# Patient Record
Sex: FEMALE | Race: WHITE | Employment: STUDENT | URBAN - NONMETROPOLITAN AREA
[De-identification: names, ages, dates, MRNs, and addresses within clinical notes are randomized per-mention and may not be internally consistent; named-entity substitution may affect disease eponyms.]

---
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
## 2023-06-15 ENCOUNTER — TELEPHONE (OUTPATIENT)
Dept: UROLOGY | Age: 18
End: 2023-06-15

## 2023-12-04 SDOH — HEALTH STABILITY: PHYSICAL HEALTH: ON AVERAGE, HOW MANY DAYS PER WEEK DO YOU ENGAGE IN MODERATE TO STRENUOUS EXERCISE (LIKE A BRISK WALK)?: 3 DAYS

## 2023-12-04 SDOH — HEALTH STABILITY: PHYSICAL HEALTH: ON AVERAGE, HOW MANY MINUTES DO YOU ENGAGE IN EXERCISE AT THIS LEVEL?: 60 MIN

## 2023-12-05 ENCOUNTER — OFFICE VISIT (OUTPATIENT)
Dept: FAMILY MEDICINE CLINIC | Age: 18
End: 2023-12-05

## 2023-12-05 VITALS
WEIGHT: 150 LBS | OXYGEN SATURATION: 98 % | DIASTOLIC BLOOD PRESSURE: 88 MMHG | SYSTOLIC BLOOD PRESSURE: 124 MMHG | HEART RATE: 82 BPM | RESPIRATION RATE: 16 BRPM | BODY MASS INDEX: 29.45 KG/M2 | HEIGHT: 60 IN | TEMPERATURE: 97.6 F

## 2023-12-05 DIAGNOSIS — Z30.42 ENCOUNTER FOR DEPO-PROVERA CONTRACEPTION: ICD-10-CM

## 2023-12-05 DIAGNOSIS — Z91.038 ALLERGY TO INSECT BITES AND STINGS: ICD-10-CM

## 2023-12-05 DIAGNOSIS — G47.00 INSOMNIA, UNSPECIFIED TYPE: Primary | ICD-10-CM

## 2023-12-05 LAB
CONTROL: POSITIVE
PREGNANCY TEST URINE, POC: NEGATIVE

## 2023-12-05 PROCEDURE — 81025 URINE PREGNANCY TEST: CPT | Performed by: NURSE PRACTITIONER

## 2023-12-05 PROCEDURE — 99204 OFFICE O/P NEW MOD 45 MIN: CPT | Performed by: NURSE PRACTITIONER

## 2023-12-05 RX ORDER — MEDROXYPROGESTERONE ACETATE 150 MG/ML
INJECTION, SUSPENSION INTRAMUSCULAR
COMMUNITY
Start: 2023-09-16 | End: 2023-12-05 | Stop reason: SDUPTHER

## 2023-12-05 RX ORDER — MEDROXYPROGESTERONE ACETATE 150 MG/ML
150 INJECTION, SUSPENSION INTRAMUSCULAR
Qty: 1 ML | Refills: 1 | Status: SHIPPED | OUTPATIENT
Start: 2023-12-05

## 2023-12-05 RX ORDER — HYDROXYZINE HYDROCHLORIDE 25 MG/1
25 TABLET, FILM COATED ORAL NIGHTLY PRN
Qty: 30 TABLET | Refills: 0 | Status: SHIPPED | OUTPATIENT
Start: 2023-12-05 | End: 2024-01-04

## 2023-12-05 SDOH — ECONOMIC STABILITY: FOOD INSECURITY: WITHIN THE PAST 12 MONTHS, YOU WORRIED THAT YOUR FOOD WOULD RUN OUT BEFORE YOU GOT MONEY TO BUY MORE.: NEVER TRUE

## 2023-12-05 SDOH — ECONOMIC STABILITY: INCOME INSECURITY: HOW HARD IS IT FOR YOU TO PAY FOR THE VERY BASICS LIKE FOOD, HOUSING, MEDICAL CARE, AND HEATING?: NOT HARD AT ALL

## 2023-12-05 SDOH — ECONOMIC STABILITY: HOUSING INSECURITY
IN THE LAST 12 MONTHS, WAS THERE A TIME WHEN YOU DID NOT HAVE A STEADY PLACE TO SLEEP OR SLEPT IN A SHELTER (INCLUDING NOW)?: NO

## 2023-12-05 SDOH — ECONOMIC STABILITY: FOOD INSECURITY: WITHIN THE PAST 12 MONTHS, THE FOOD YOU BOUGHT JUST DIDN'T LAST AND YOU DIDN'T HAVE MONEY TO GET MORE.: NEVER TRUE

## 2023-12-05 ASSESSMENT — ENCOUNTER SYMPTOMS
COUGH: 0
BACK PAIN: 0
NAUSEA: 0
RHINORRHEA: 0
CONSTIPATION: 0
ABDOMINAL DISTENTION: 0
COLOR CHANGE: 0
ABDOMINAL PAIN: 0
SHORTNESS OF BREATH: 0
DIARRHEA: 0
CHEST TIGHTNESS: 0
SORE THROAT: 0

## 2023-12-05 ASSESSMENT — PATIENT HEALTH QUESTIONNAIRE - PHQ9
1. LITTLE INTEREST OR PLEASURE IN DOING THINGS: 0
SUM OF ALL RESPONSES TO PHQ QUESTIONS 1-9: 0
SUM OF ALL RESPONSES TO PHQ QUESTIONS 1-9: 0
SUM OF ALL RESPONSES TO PHQ9 QUESTIONS 1 & 2: 0
2. FEELING DOWN, DEPRESSED OR HOPELESS: 0
SUM OF ALL RESPONSES TO PHQ QUESTIONS 1-9: 0
SUM OF ALL RESPONSES TO PHQ QUESTIONS 1-9: 0

## 2023-12-05 NOTE — PROGRESS NOTES
Marilia Jane, APRN-CNP  [address] [facility]  [address] [address] [facility], [address] [address]  Dept: [phone]  Dept Fax: [phone]     Patient ID: Hawa Castro is a 21 y.o. female. HPI    Hawa Castro is a 21 y.o. female New patient who presents to the office today for a first visit and to establish a relationship with a new primary care provider. Previous PCP: Dr. Luis Schaffer last seen: SAHIL,[id number] from [facility]    Today, the patient complains of needing depo shot.   - depo for about 4 years now. She understands the risk of being on it  -Trazadone was giving her bad migraines, she was taking it for insomnia. Trouble staying asleep she will wake up at least 3-5 times a night. Preventative care, female:  Nicotine use: never  Alcohol use: never  Drug use: never  Dental exam: a year ago, missed cleaning due to moving   Eye exam: a couple years ago     Specialists:  None    Previous office notes, labs, imaging and hospital records were reviewed prior to and during encounter. The patient's past medical, surgical, social, and family history as well as her current medications and allergies were reviewed as documented in today's encounter by RORY Major. Current Outpatient Medications on File Prior to Visit   Medication Sig Dispense Refill    traZODone (DESYREL) 50 MG tablet Take 1 tablet by mouth nightly as needed for Sleep 30 tablet 5    EPINEPHrine (EPIPEN 2-MALKA) 0.3 MG/0.3ML SOAJ injection Use as directed for allergic reaction  Ok to dispense 2 if this is a single pack (Patient not taking: Reported on 10/26/2022) 1 each 0     No current facility-administered medications on file prior to visit. Subjective:     Review of Systems   Constitutional:  Negative for activity change, fatigue and fever. HENT:  Negative for congestion, ear pain, rhinorrhea and sore throat. Respiratory:  Negative for cough, chest tightness and shortness of breath.

## 2024-01-02 ENCOUNTER — NURSE ONLY (OUTPATIENT)
Dept: FAMILY MEDICINE CLINIC | Age: 19
End: 2024-01-02

## 2024-01-02 DIAGNOSIS — Z30.42 ENCOUNTER FOR SURVEILLANCE OF INJECTABLE CONTRACEPTIVE: Primary | ICD-10-CM

## 2024-01-02 PROCEDURE — 96372 THER/PROPH/DIAG INJ SC/IM: CPT | Performed by: NURSE PRACTITIONER

## 2024-01-02 RX ORDER — MEDROXYPROGESTERONE ACETATE 150 MG/ML
150 INJECTION, SUSPENSION INTRAMUSCULAR ONCE
Status: COMPLETED | OUTPATIENT
Start: 2024-01-02 | End: 2024-01-02

## 2024-01-02 RX ADMIN — MEDROXYPROGESTERONE ACETATE 150 MG: 150 INJECTION, SUSPENSION INTRAMUSCULAR at 14:58

## 2024-01-02 ASSESSMENT — PATIENT HEALTH QUESTIONNAIRE - PHQ9
SUM OF ALL RESPONSES TO PHQ9 QUESTIONS 1 & 2: 0
SUM OF ALL RESPONSES TO PHQ QUESTIONS 1-9: 0
1. LITTLE INTEREST OR PLEASURE IN DOING THINGS: 0
SUM OF ALL RESPONSES TO PHQ QUESTIONS 1-9: 0
SUM OF ALL RESPONSES TO PHQ QUESTIONS 1-9: 0
2. FEELING DOWN, DEPRESSED OR HOPELESS: 0
SUM OF ALL RESPONSES TO PHQ QUESTIONS 1-9: 0

## 2024-01-04 ENCOUNTER — OFFICE VISIT (OUTPATIENT)
Dept: FAMILY MEDICINE CLINIC | Age: 19
End: 2024-01-04

## 2024-01-04 VITALS
DIASTOLIC BLOOD PRESSURE: 84 MMHG | BODY MASS INDEX: 29.88 KG/M2 | RESPIRATION RATE: 16 BRPM | HEART RATE: 88 BPM | WEIGHT: 153 LBS | OXYGEN SATURATION: 98 % | TEMPERATURE: 98.2 F | SYSTOLIC BLOOD PRESSURE: 130 MMHG

## 2024-01-04 DIAGNOSIS — B96.89 ACUTE BACTERIAL SINUSITIS: Primary | ICD-10-CM

## 2024-01-04 DIAGNOSIS — U07.1 COVID-19: ICD-10-CM

## 2024-01-04 DIAGNOSIS — J01.90 ACUTE BACTERIAL SINUSITIS: Primary | ICD-10-CM

## 2024-01-04 DIAGNOSIS — Z30.014 ENCOUNTER FOR INITIAL PRESCRIPTION OF INTRAUTERINE CONTRACEPTIVE DEVICE (IUD): ICD-10-CM

## 2024-01-04 PROCEDURE — 99213 OFFICE O/P EST LOW 20 MIN: CPT | Performed by: NURSE PRACTITIONER

## 2024-01-04 RX ORDER — PREDNISONE 20 MG/1
20 TABLET ORAL DAILY
Qty: 5 TABLET | Refills: 0 | Status: SHIPPED | OUTPATIENT
Start: 2024-01-04 | End: 2024-01-09

## 2024-01-04 RX ORDER — AZITHROMYCIN 250 MG/1
250 TABLET, FILM COATED ORAL SEE ADMIN INSTRUCTIONS
Qty: 6 TABLET | Refills: 0 | Status: SHIPPED | OUTPATIENT
Start: 2024-01-04 | End: 2024-01-09

## 2024-01-04 ASSESSMENT — ENCOUNTER SYMPTOMS
SHORTNESS OF BREATH: 1
COLOR CHANGE: 0
DIARRHEA: 0
NAUSEA: 0
BACK PAIN: 0
ABDOMINAL DISTENTION: 0
ABDOMINAL PAIN: 0
COUGH: 0
CONSTIPATION: 0
RHINORRHEA: 0
SORE THROAT: 1
CHEST TIGHTNESS: 1

## 2024-01-04 NOTE — PROGRESS NOTES
Oanh Jaime, APRN-CNP  [facility]  [facility] MEDICINE  [address], [address]  [facility] [address]  Dept: [phone]  Dept Fax: [phone]     Patient ID: Essence Ornelas is a 18 y.o. female Established patient    HPI    Pt here today for an acute visit secondary to recently had covid but has tested negative as of Wednesday last week. Since having covid she is having a heaviness to her chest and feels like it is hard to breath at times. Feels like she is having shortness of breath with doing activities or even talking. She has also developed a rash to her hands and bilateral knees and feels itchy.     Pt denies any fever or chills.  Pt today denies any HA, chest pain, or SOB.  Pt denies any N/V/D/C or abdominal pain.    Otherwise pt doing well on current tx and no other concerns today.     The patient's past medical, surgical, social, and family history as well as his current medications and allergies were reviewed as documented in today's encounter by RORY Sapp.      Previous office notes, labs, imaging and hospital records were reviewed prior to and during encounter.    Current Outpatient Medications on File Prior to Visit   Medication Sig Dispense Refill    hydrOXYzine HCl (ATARAX) 25 MG tablet Take 1 tablet by mouth nightly as needed (insomnia) 30 tablet 0    medroxyPROGESTERone (DEPO-PROVERA) 150 MG/ML injection Inject 150 mg into the muscle every 3 months 1 mL 1    fluticasone (FLONASE) 50 MCG/ACT nasal spray 1 spray by Each Nostril route daily (Patient not taking: Reported on 1/4/2024) 32 g 0     No current facility-administered medications on file prior to visit.        Subjective:     Review of Systems   Constitutional:  Negative for activity change, fatigue and fever.   HENT:  Positive for sore throat. Negative for congestion, ear pain and rhinorrhea.    Respiratory:  Positive for chest tightness and shortness of breath. Negative for cough.

## 2024-01-11 ENCOUNTER — TELEPHONE (OUTPATIENT)
Dept: OBGYN CLINIC | Age: 19
End: 2024-01-11

## 2024-01-23 DIAGNOSIS — J01.90 ACUTE BACTERIAL SINUSITIS: ICD-10-CM

## 2024-01-23 DIAGNOSIS — B96.89 ACUTE BACTERIAL SINUSITIS: ICD-10-CM

## 2024-01-23 DIAGNOSIS — Z30.42 ENCOUNTER FOR DEPO-PROVERA CONTRACEPTION: ICD-10-CM

## 2024-01-23 DIAGNOSIS — G47.00 INSOMNIA, UNSPECIFIED TYPE: ICD-10-CM

## 2024-01-23 RX ORDER — FLUTICASONE PROPIONATE 50 MCG
SPRAY, SUSPENSION (ML) NASAL
Qty: 1 EACH | Refills: 3 | Status: SHIPPED | OUTPATIENT
Start: 2024-01-23

## 2024-01-23 RX ORDER — HYDROXYZINE HYDROCHLORIDE 25 MG/1
25 TABLET, FILM COATED ORAL NIGHTLY PRN
Qty: 30 TABLET | Refills: 1 | Status: SHIPPED | OUTPATIENT
Start: 2024-01-23 | End: 2024-03-23

## 2024-01-24 RX ORDER — MEDROXYPROGESTERONE ACETATE 150 MG/ML
150 INJECTION, SUSPENSION INTRAMUSCULAR
Qty: 1 ML | Refills: 1 | OUTPATIENT
Start: 2024-01-24

## 2024-01-28 DIAGNOSIS — Z30.42 ENCOUNTER FOR DEPO-PROVERA CONTRACEPTION: ICD-10-CM

## 2024-01-29 RX ORDER — MEDROXYPROGESTERONE ACETATE 150 MG/ML
150 INJECTION, SUSPENSION INTRAMUSCULAR
Qty: 1 ML | Refills: 1 | OUTPATIENT
Start: 2024-01-29

## 2024-01-29 NOTE — TELEPHONE ENCOUNTER
Last Visit Date: 1/4/2024   Next Visit Date: 6/5/2024     Patient Comment: Decided against IUD, would like to continue deop for now

## 2024-02-28 ENCOUNTER — TELEMEDICINE (OUTPATIENT)
Dept: FAMILY MEDICINE CLINIC | Age: 19
End: 2024-02-28

## 2024-02-28 DIAGNOSIS — J01.90 ACUTE BACTERIAL SINUSITIS: Primary | ICD-10-CM

## 2024-02-28 DIAGNOSIS — B96.89 ACUTE BACTERIAL SINUSITIS: Primary | ICD-10-CM

## 2024-02-28 PROCEDURE — 99213 OFFICE O/P EST LOW 20 MIN: CPT | Performed by: NURSE PRACTITIONER

## 2024-02-28 RX ORDER — AZITHROMYCIN 250 MG/1
TABLET, FILM COATED ORAL
Qty: 6 TABLET | Refills: 0 | Status: SHIPPED | OUTPATIENT
Start: 2024-02-28 | End: 2024-03-09

## 2024-02-28 ASSESSMENT — ENCOUNTER SYMPTOMS
NAUSEA: 0
ABDOMINAL DISTENTION: 0
BACK PAIN: 0
DIARRHEA: 0
SINUS PRESSURE: 1
CHEST TIGHTNESS: 0
SHORTNESS OF BREATH: 0
COUGH: 1
SORE THROAT: 1
COLOR CHANGE: 0
SINUS PAIN: 1
RHINORRHEA: 1
CONSTIPATION: 0
ABDOMINAL PAIN: 0

## 2024-02-28 NOTE — PROGRESS NOTES
Oanh Jaime, JAVIER-CNP  [facility]  [address], [address]  [facility] [address]  Dept: [phone]  Dept Fax: [phone]    Patient ID: Essence Ornelas is a 18 y.o. female.    HPI     Subjective:     Essence Ornelas is a 18 y.o. female who presents today via virtual visit complaining of itchy scratchy throat, nasal congestion, hears fluid in her ears with blowing nose and feels like she has a double ear infection, especially the left, low grade fever 100, for 3 days. She can feel a tender lymph node to the left side of neck. She is taking sudafed and nasal spray. She did do a covid test that was negative. Had covid recently     Pt denies any fever or chills.  Pt today denies any HA, chest pain, or SOB.  Pt denies any N/V/D/C or abdominal pain.    Otherwise pt doing well on current tx and no other concerns today.     The patient's past medical, surgical, social, and family history as well as his current medications and allergies were reviewed as documented in today's encounter by RORY Sapp.    Current Outpatient Medications on File Prior to Visit   Medication Sig Dispense Refill    fluticasone (FLONASE) 50 MCG/ACT nasal spray SPRAY 1 SPRAY INTO EACH NOSTRIL EVERY DAY 1 each 3    hydrOXYzine HCl (ATARAX) 25 MG tablet TAKE 1 TABLET BY MOUTH NIGHTLY AS NEEDED (INSOMNIA). 30 tablet 1    medroxyPROGESTERone (DEPO-PROVERA) 150 MG/ML injection Inject 150 mg into the muscle every 3 months 1 mL 1     No current facility-administered medications on file prior to visit.     Review of Systems   Constitutional:  Negative for activity change, fatigue and fever.   HENT:  Positive for congestion, ear pain, postnasal drip, rhinorrhea, sinus pressure, sinus pain and sore throat.    Respiratory:  Positive for cough. Negative for chest tightness and shortness of breath.    Cardiovascular:  Negative for chest pain and palpitations.   Gastrointestinal:  Negative for abdominal

## 2024-03-06 ENCOUNTER — OFFICE VISIT (OUTPATIENT)
Dept: FAMILY MEDICINE CLINIC | Age: 19
End: 2024-03-06

## 2024-03-06 VITALS
SYSTOLIC BLOOD PRESSURE: 110 MMHG | BODY MASS INDEX: 29.88 KG/M2 | TEMPERATURE: 97.6 F | RESPIRATION RATE: 16 BRPM | DIASTOLIC BLOOD PRESSURE: 70 MMHG | WEIGHT: 153 LBS | OXYGEN SATURATION: 98 % | HEART RATE: 70 BPM

## 2024-03-06 DIAGNOSIS — R09.81 SINUS CONGESTION: Primary | ICD-10-CM

## 2024-03-06 DIAGNOSIS — H92.03 OTALGIA OF BOTH EARS: ICD-10-CM

## 2024-03-06 PROCEDURE — 99213 OFFICE O/P EST LOW 20 MIN: CPT | Performed by: NURSE PRACTITIONER

## 2024-03-06 RX ORDER — PHENYLEPHRINE HCL 10 MG/1
10 TABLET, FILM COATED ORAL EVERY 6 HOURS PRN
Qty: 60 TABLET | Refills: 0 | Status: SHIPPED | OUTPATIENT
Start: 2024-03-06 | End: 2024-04-05

## 2024-03-06 ASSESSMENT — ENCOUNTER SYMPTOMS
SINUS PRESSURE: 1
NAUSEA: 0
RHINORRHEA: 1
ABDOMINAL DISTENTION: 0
BACK PAIN: 0
SHORTNESS OF BREATH: 0
CHEST TIGHTNESS: 0
CONSTIPATION: 0
COUGH: 0
DIARRHEA: 0
ABDOMINAL PAIN: 0
COLOR CHANGE: 0
SORE THROAT: 0

## 2024-03-06 NOTE — PROGRESS NOTES
Oanh Jaime, APRN-CNP  [facility]  [facility] MEDICINE  [address], [address]  [facility] [address]  Dept: [phone]  Dept Fax: [phone]     Patient ID: Essence Ornelas is a 18 y.o. female Established patient    HPI    Pt here today for an acute visit secondary to bilateral ear pain with left is worse than right. This has been ongoing, pt was seen for virtual visit on 2/28 and was given azithromycin and the congestion has gotten better but still there, but she is having a lot of  pressure.     Pt denies any fever or chills.  Pt today denies any HA, chest pain, or SOB.  Pt denies any N/V/D/C or abdominal pain.    Otherwise pt doing well on current tx and no other concerns today.     The patient's past medical, surgical, social, and family history as well as his current medications and allergies were reviewed as documented in today's encounter by RORY Sapp.      Previous office notes, labs, imaging and hospital records were reviewed prior to and during encounter.    Current Outpatient Medications on File Prior to Visit   Medication Sig Dispense Refill    azithromycin (ZITHROMAX) 250 MG tablet 500mg on day 1 followed by 250mg on days 2 - 5 6 tablet 0    fluticasone (FLONASE) 50 MCG/ACT nasal spray SPRAY 1 SPRAY INTO EACH NOSTRIL EVERY DAY 1 each 3    hydrOXYzine HCl (ATARAX) 25 MG tablet TAKE 1 TABLET BY MOUTH NIGHTLY AS NEEDED (INSOMNIA). 30 tablet 1    medroxyPROGESTERone (DEPO-PROVERA) 150 MG/ML injection Inject 150 mg into the muscle every 3 months 1 mL 1     No current facility-administered medications on file prior to visit.        Subjective:     Review of Systems   Constitutional:  Negative for activity change, fatigue and fever.   HENT:  Positive for ear pain, rhinorrhea and sinus pressure. Negative for congestion and sore throat.    Respiratory:  Negative for cough, chest tightness and shortness of breath.    Cardiovascular:  Negative for chest pain

## 2024-03-26 ENCOUNTER — NURSE ONLY (OUTPATIENT)
Dept: FAMILY MEDICINE CLINIC | Age: 19
End: 2024-03-26

## 2024-03-26 DIAGNOSIS — Z30.42 ENCOUNTER FOR SURVEILLANCE OF INJECTABLE CONTRACEPTIVE: Primary | ICD-10-CM

## 2024-03-26 PROCEDURE — 96372 THER/PROPH/DIAG INJ SC/IM: CPT | Performed by: FAMILY MEDICINE

## 2024-03-26 RX ORDER — MEDROXYPROGESTERONE ACETATE 150 MG/ML
150 INJECTION, SUSPENSION INTRAMUSCULAR ONCE
Status: COMPLETED | OUTPATIENT
Start: 2024-03-26 | End: 2024-03-26

## 2024-03-26 RX ADMIN — MEDROXYPROGESTERONE ACETATE 150 MG: 150 INJECTION, SUSPENSION INTRAMUSCULAR at 11:00

## 2024-04-19 DIAGNOSIS — G47.00 INSOMNIA, UNSPECIFIED TYPE: ICD-10-CM

## 2024-04-19 RX ORDER — HYDROXYZINE HYDROCHLORIDE 25 MG/1
25 TABLET, FILM COATED ORAL NIGHTLY PRN
Qty: 30 TABLET | Refills: 1 | Status: SHIPPED | OUTPATIENT
Start: 2024-04-19 | End: 2024-06-18

## 2024-05-17 ENCOUNTER — PATIENT MESSAGE (OUTPATIENT)
Dept: FAMILY MEDICINE CLINIC | Age: 19
End: 2024-05-17

## 2024-05-17 DIAGNOSIS — Z91.038 ALLERGY TO INSECT BITES AND STINGS: ICD-10-CM

## 2024-05-17 RX ORDER — EPINEPHRINE 0.3 MG/.3ML
INJECTION SUBCUTANEOUS
Qty: 1 EACH | Refills: 0 | Status: SHIPPED | OUTPATIENT
Start: 2024-05-17

## 2024-05-17 NOTE — TELEPHONE ENCOUNTER
From: Essence Ornelas  To: Oanh Jaime  Sent: 5/17/2024 2:03 PM EDT  Subject: Epi pen     Hi there, can you please send a refill over for my epi pen to the [facility] in [facility]. Thank you!

## 2024-06-06 DIAGNOSIS — Z30.42 ENCOUNTER FOR DEPO-PROVERA CONTRACEPTION: ICD-10-CM

## 2024-06-06 RX ORDER — MEDROXYPROGESTERONE ACETATE 150 MG/ML
INJECTION, SUSPENSION INTRAMUSCULAR
Qty: 3 EACH | Refills: 1 | Status: SHIPPED | OUTPATIENT
Start: 2024-06-06

## 2024-06-18 ENCOUNTER — NURSE ONLY (OUTPATIENT)
Dept: FAMILY MEDICINE CLINIC | Age: 19
End: 2024-06-18

## 2024-06-18 DIAGNOSIS — Z30.42 ENCOUNTER FOR DEPO-PROVERA CONTRACEPTION: Primary | ICD-10-CM

## 2024-06-18 RX ORDER — MEDROXYPROGESTERONE ACETATE 150 MG/ML
150 INJECTION, SUSPENSION INTRAMUSCULAR ONCE
Status: COMPLETED | OUTPATIENT
Start: 2024-06-18 | End: 2024-06-18

## 2024-06-18 RX ADMIN — MEDROXYPROGESTERONE ACETATE 150 MG: 150 INJECTION, SUSPENSION INTRAMUSCULAR at 11:03

## 2024-09-10 ENCOUNTER — LAB (OUTPATIENT)
Dept: FAMILY MEDICINE CLINIC | Age: 19
End: 2024-09-10

## 2024-09-10 DIAGNOSIS — Z30.8 ENCOUNTER FOR OTHER CONTRACEPTIVE MANAGEMENT: Primary | ICD-10-CM

## 2024-09-10 PROCEDURE — 96372 THER/PROPH/DIAG INJ SC/IM: CPT | Performed by: NURSE PRACTITIONER

## 2024-09-10 RX ORDER — MEDROXYPROGESTERONE ACETATE 150 MG/ML
150 INJECTION, SUSPENSION INTRAMUSCULAR ONCE
Status: COMPLETED | OUTPATIENT
Start: 2024-09-10 | End: 2024-09-10

## 2024-09-10 RX ADMIN — MEDROXYPROGESTERONE ACETATE 150 MG: 150 INJECTION, SUSPENSION INTRAMUSCULAR at 11:21

## 2025-02-07 ENCOUNTER — TELEMEDICINE ON DEMAND (OUTPATIENT)
Age: 20
End: 2025-02-07

## 2025-02-07 ENCOUNTER — TELEPHONE (OUTPATIENT)
Dept: FAMILY MEDICINE CLINIC | Age: 20
End: 2025-02-07

## 2025-02-07 DIAGNOSIS — J06.9 VIRAL URI WITH COUGH: Primary | ICD-10-CM

## 2025-02-07 PROCEDURE — 99213 OFFICE O/P EST LOW 20 MIN: CPT | Performed by: NURSE PRACTITIONER

## 2025-02-07 RX ORDER — BROMPHENIRAMINE MALEATE, PSEUDOEPHEDRINE HYDROCHLORIDE, AND DEXTROMETHORPHAN HYDROBROMIDE 2; 30; 10 MG/5ML; MG/5ML; MG/5ML
5-10 SYRUP ORAL 4 TIMES DAILY PRN
Qty: 200 ML | Refills: 0 | Status: SHIPPED | OUTPATIENT
Start: 2025-02-07

## 2025-02-07 RX ORDER — IBUPROFEN 800 MG/1
800 TABLET, FILM COATED ORAL EVERY 8 HOURS PRN
Qty: 30 TABLET | Refills: 0 | Status: SHIPPED | OUTPATIENT
Start: 2025-02-07

## 2025-02-07 ASSESSMENT — ENCOUNTER SYMPTOMS
COUGH: 1
RHINORRHEA: 1
SORE THROAT: 1
CHEST TIGHTNESS: 1

## 2025-02-07 NOTE — PROGRESS NOTES
Essence Ornelas (:  2005) is a Established patient, here for evaluation of the following:    Cold Symptoms (X2 days, runny nose, sinus pressure, feels hot, wet cough, chest tightness/burning )       Assessment & Plan:  Below is the assessment and plan developed based on review of pertinent history, physical exam, labs, studies, and medications.  1. Viral URI with cough  -     brompheniramine-pseudoephedrine-DM 2-30-10 MG/5ML syrup; Take 5-10 mLs by mouth 4 times daily as needed for Cough, Disp-200 mL, R-0Normal  -     ibuprofen (ADVIL;MOTRIN) 800 MG tablet; Take 1 tablet by mouth every 8 hours as needed for Pain, Disp-30 tablet, R-0Normal  Suspect parainfluenza. Will treat symptoms and have patient take a home flu test and notify the virtualist of the results.   The patient was educated on reasons to seek urgent medical care including chest pain, shortness of breath or difficulty breathing at rest, severe pain, fever >102 not controlled by medication, unrelenting vomiting or diarrhea, blue-tinged lips or nailbeds, and severe weakness or confusion. Patient verbalized understanding  The patient would benefit from future follow up with their usual PCP. As of the end of their Virtualist Visit today, follow up visit status is as follows: Patient to follow up as previously instructed by PCP    Return if symptoms worsen or fail to improve.    Subjective:   Cold Symptoms   This is a new problem. The current episode started in the past 7 days (3 days ago). There has been no fever. Associated symptoms include congestion, coughing, headaches, rhinorrhea and a sore throat (irritated in her throat). Treatments tried: mucinex night time and day time liquid. The treatment provided mild relief.     Review of Systems   HENT:  Positive for congestion, postnasal drip, rhinorrhea and sore throat (irritated in her throat).    Respiratory:  Positive for cough and chest tightness.    Neurological:  Positive for headaches.

## 2025-02-07 NOTE — TELEPHONE ENCOUNTER
Patient calling into [facility] office asking for a work note from visit today. Informed patient I should inform Emily Diallo

## 2025-03-01 ENCOUNTER — HOSPITAL ENCOUNTER (EMERGENCY)
Age: 20
Discharge: HOME | End: 2025-03-01
Attending: EMERGENCY MEDICINE

## 2025-03-01 VITALS
WEIGHT: 125 LBS | BODY MASS INDEX: 24.54 KG/M2 | DIASTOLIC BLOOD PRESSURE: 81 MMHG | HEART RATE: 92 BPM | TEMPERATURE: 97.9 F | RESPIRATION RATE: 20 BRPM | HEIGHT: 60 IN | SYSTOLIC BLOOD PRESSURE: 128 MMHG | OXYGEN SATURATION: 100 %

## 2025-03-01 DIAGNOSIS — R07.0 THROAT PAIN: Primary | ICD-10-CM

## 2025-03-01 PROCEDURE — 6360000002 HC RX W HCPCS: Performed by: EMERGENCY MEDICINE

## 2025-03-01 PROCEDURE — 99284 EMERGENCY DEPT VISIT MOD MDM: CPT

## 2025-03-01 PROCEDURE — 96372 THER/PROPH/DIAG INJ SC/IM: CPT

## 2025-03-01 RX ORDER — PREDNISONE 10 MG/1
TABLET ORAL
Qty: 20 TABLET | Refills: 0 | Status: SHIPPED | OUTPATIENT
Start: 2025-03-01 | End: 2025-03-11

## 2025-03-01 RX ORDER — DEXAMETHASONE 4 MG/1
4 TABLET ORAL ONCE
Status: COMPLETED | OUTPATIENT
Start: 2025-03-01 | End: 2025-03-01

## 2025-03-01 RX ORDER — KETOROLAC TROMETHAMINE 30 MG/ML
30 INJECTION, SOLUTION INTRAMUSCULAR; INTRAVENOUS ONCE
Status: COMPLETED | OUTPATIENT
Start: 2025-03-01 | End: 2025-03-01

## 2025-03-01 RX ADMIN — KETOROLAC TROMETHAMINE 30 MG: 30 INJECTION, SOLUTION INTRAMUSCULAR at 02:24

## 2025-03-01 RX ADMIN — DEXAMETHASONE 4 MG: 4 TABLET ORAL at 02:24

## 2025-03-01 ASSESSMENT — PAIN SCALES - GENERAL
PAINLEVEL_OUTOF10: 10
PAINLEVEL_OUTOF10: 3
PAINLEVEL_OUTOF10: 10

## 2025-03-01 ASSESSMENT — PAIN - FUNCTIONAL ASSESSMENT: PAIN_FUNCTIONAL_ASSESSMENT: 0-10

## 2025-03-01 ASSESSMENT — PAIN DESCRIPTION - LOCATION
LOCATION: THROAT
LOCATION: THROAT

## 2025-03-01 NOTE — DISCHARGE INSTRUCTIONS
You have come relief from Toradol and from some steroids Ragona continue on the steroids for a couple of days you should continue with ibuprofen warm salt water gargles are helpful and warm tea lemon and honey will also be helpful.  Follow-up with your doctor in 2 days return if worse

## 2025-03-01 NOTE — ED PROVIDER NOTES
eMERGENCY dEPARTMENT eNCOUnter      Pt Name: Essence Ornelas  MRN: [id number]  Birthdate 2005  Date of evaluation: 3/1/2025      CHIEF COMPLAINT       Chief Complaint   Patient presents with    chemical burn to throat     C/o continued pain to throat after ingesting bleach on Monday; has been to urgent care 3 times since event, has been taking amoxicillin and ibuprofen (last dose 0100)          HISTORY OF PRESENT ILLNESS    Essence Ornelas is a 19 y.o. female who presents to the emergency department complaining of ongoing throat pain that she has after having inhaling bleach fumes 5 days ago.  Patient was evaluated at urgent care several times and they put her on some amoxicillin ibuprofen.  Not sure why the antibiotic was given.  They did state to her that she possibly had \"throat burns\" but were unable to get the visual evaluation of her throat.    REVIEW OF SYSTEMS     Constitutional: No fevers or chills  HEENT: Positive sore throat,no  rhinorrhea, or earache  Eyes: No blurry vision or double vision no drainage  Cardiovascular: No chest pain or tachycardia  Respiratory: No wheezing or shortness of breath no cough  Gastrointestinal: No nausea, vomiting, diarrhea, constipation, or abdominal pain   : No hematuria or dysuria  Musculoskeletal: No swelling or pain  Skin: No rash   Neurological: No focal neurologic complaints, paresthesias, weakness, or headache    PAST MEDICAL HISTORY    has a past medical history of Allergic, Frequent UTI, and Mastoiditis of left side.    SURGICAL HISTORY      has no past surgical history on file.    CURRENT MEDICATIONS       Previous Medications    BROMPHENIRAMINE-PSEUDOEPHEDRINE-DM 2-30-10 MG/5ML SYRUP    Take 5-10 mLs by mouth 4 times daily as needed for Cough    EPINEPHRINE (EPIPEN 2-MALKA) 0.3 MG/0.3ML SOAJ INJECTION    Use as directed for allergic reaction  Ok to dispense 2 if this is a single pack    IBUPROFEN (ADVIL;MOTRIN) 800 MG TABLET    Take 1 tablet by mouth every